# Patient Record
Sex: FEMALE | Race: WHITE
[De-identification: names, ages, dates, MRNs, and addresses within clinical notes are randomized per-mention and may not be internally consistent; named-entity substitution may affect disease eponyms.]

---

## 2017-01-10 ENCOUNTER — PRIOR ORIGINAL RECORDS (OUTPATIENT)
Dept: OTHER | Age: 82
End: 2017-01-10

## 2017-01-10 PROCEDURE — 36415 COLL VENOUS BLD VENIPUNCTURE: CPT | Performed by: INTERNAL MEDICINE

## 2017-01-10 PROCEDURE — 82550 ASSAY OF CK (CPK): CPT | Performed by: INTERNAL MEDICINE

## 2017-01-10 PROCEDURE — 80053 COMPREHEN METABOLIC PANEL: CPT | Performed by: INTERNAL MEDICINE

## 2017-01-10 PROCEDURE — 81003 URINALYSIS AUTO W/O SCOPE: CPT | Performed by: INTERNAL MEDICINE

## 2017-01-10 PROCEDURE — 80061 LIPID PANEL: CPT | Performed by: INTERNAL MEDICINE

## 2017-01-10 PROCEDURE — 83036 HEMOGLOBIN GLYCOSYLATED A1C: CPT | Performed by: INTERNAL MEDICINE

## 2017-01-10 PROCEDURE — 85025 COMPLETE CBC W/AUTO DIFF WBC: CPT | Performed by: INTERNAL MEDICINE

## 2017-02-06 ENCOUNTER — PRIOR ORIGINAL RECORDS (OUTPATIENT)
Dept: OTHER | Age: 82
End: 2017-02-06

## 2017-02-08 LAB
ALBUMIN: 3.7 G/DL
ALKALINE PHOSPHATATE(ALK PHOS): 68 IU/L
BILIRUBIN TOTAL: 0.5 MG/DL
BUN: 23 MG/DL
CALCIUM: 8.8 MG/DL
CHLORIDE: 104 MEQ/L
CHOLESTEROL, TOTAL: 158 MG/DL
CREATININE KINASE: 69 U/L
CREATININE, SERUM: 1.03 MG/DL
GLUCOSE: 172 MG/DL
HDL CHOLESTEROL: 80 MG/DL
HEMATOCRIT: 41.3 %
HEMOGLOBIN A1C: 7.3 %
HEMOGLOBIN: 14 G/DL
LDL CHOLESTEROL: 57 MG/DL
PLATELETS: 177 K/UL
POTASSIUM, SERUM: 4.1 MEQ/L
PROTEIN, TOTAL: 7.4 G/DL
RED BLOOD COUNT: 4.37 X 10-6/U
SGOT (AST): 18 IU/L
SGPT (ALT): 20 IU/L
SODIUM: 140 MEQ/L
TRIGLYCERIDES: 106 MG/DL
WHITE BLOOD COUNT: 6.9 X 10-3/U

## 2017-04-13 ENCOUNTER — APPOINTMENT (OUTPATIENT)
Dept: GENERAL RADIOLOGY | Age: 82
End: 2017-04-13
Attending: EMERGENCY MEDICINE
Payer: MEDICARE

## 2017-04-13 ENCOUNTER — HOSPITAL ENCOUNTER (EMERGENCY)
Age: 82
Discharge: HOME | End: 2017-04-13
Attending: EMERGENCY MEDICINE
Payer: MEDICARE

## 2017-04-13 VITALS
HEIGHT: 65 IN | HEART RATE: 88 BPM | WEIGHT: 220 LBS | SYSTOLIC BLOOD PRESSURE: 110 MMHG | RESPIRATION RATE: 20 BRPM | DIASTOLIC BLOOD PRESSURE: 63 MMHG | BODY MASS INDEX: 36.65 KG/M2 | OXYGEN SATURATION: 95 %

## 2017-04-13 DIAGNOSIS — I48.92 ATRIAL FIBRILLATION AND FLUTTER (HCC): Primary | ICD-10-CM

## 2017-04-13 DIAGNOSIS — I48.91 ATRIAL FIBRILLATION AND FLUTTER (HCC): Primary | ICD-10-CM

## 2017-04-13 PROCEDURE — 93005 ELECTROCARDIOGRAM TRACING: CPT

## 2017-04-13 PROCEDURE — 85610 PROTHROMBIN TIME: CPT | Performed by: EMERGENCY MEDICINE

## 2017-04-13 PROCEDURE — 99285 EMERGENCY DEPT VISIT HI MDM: CPT

## 2017-04-13 PROCEDURE — 36415 COLL VENOUS BLD VENIPUNCTURE: CPT

## 2017-04-13 PROCEDURE — 85025 COMPLETE CBC W/AUTO DIFF WBC: CPT | Performed by: EMERGENCY MEDICINE

## 2017-04-13 PROCEDURE — 85730 THROMBOPLASTIN TIME PARTIAL: CPT | Performed by: EMERGENCY MEDICINE

## 2017-04-13 PROCEDURE — 84484 ASSAY OF TROPONIN QUANT: CPT | Performed by: EMERGENCY MEDICINE

## 2017-04-13 PROCEDURE — 84443 ASSAY THYROID STIM HORMONE: CPT | Performed by: EMERGENCY MEDICINE

## 2017-04-13 PROCEDURE — 93010 ELECTROCARDIOGRAM REPORT: CPT

## 2017-04-13 PROCEDURE — 80053 COMPREHEN METABOLIC PANEL: CPT | Performed by: EMERGENCY MEDICINE

## 2017-04-13 PROCEDURE — 71010 XR CHEST AP PORTABLE  (CPT=71010): CPT

## 2017-04-13 RX ORDER — ASPIRIN 81 MG/1
81 TABLET, CHEWABLE ORAL ONCE
Status: COMPLETED | OUTPATIENT
Start: 2017-04-13 | End: 2017-04-13

## 2017-04-13 NOTE — ED PROVIDER NOTES
Patient Seen in: [facility] Emergency Department In [facility]    History   Patient presents with:  Arrythmia/Palpitations (cardiovascular)    Stated Complaint: PALPITATION    HPI    Patient is a 25-year-old female who has history of intermittent atrial fibr daily.   Irbesartan 300 MG Oral Tab,  Take 300 mg by mouth daily. aspirin 81 MG Oral Tab,  Take 81 mg by mouth every 7 days. LUMIGAN 0.01 % Ophthalmic Solution,     Benazepril HCl 20 MG Oral Tab,  Take 20 mg by mouth 2 (two) times daily.      Abel Garcia kg  BMI 36.61 kg/m2  SpO2 95%        Physical Exam  GENERAL: Patient resting comfortably on the cart in no acute distress. HEENT: Extraocular muscles intact, pupils equal round reactive to light and accommodation.   Mouth normal, neck supple, no meningismu REFLEX TO FREE T4   RAINBOW DRAW LAVENDER   RAINBOW DRAW LIGHT GREEN   RAINBOW DRAW BLUE   RAINBOW DRAW GOLD      EKG    Rate, intervals and axes as noted on EKG Report.   Rate: 93  Rhythm: Atrial Fibrillation  Reading: Atrial fibrillation, nonspecific ST c

## 2017-04-13 NOTE — ED INITIAL ASSESSMENT (HPI)
Pt states, \"I feel like I'm back in a-fib\". Pt states hx of afib. Denies chest pain, yet states SOB. Respirations regular, non-labored.

## 2017-04-14 ENCOUNTER — MYAURORA ACCOUNT LINK (OUTPATIENT)
Dept: OTHER | Age: 82
End: 2017-04-14

## 2017-04-14 ENCOUNTER — PRIOR ORIGINAL RECORDS (OUTPATIENT)
Dept: OTHER | Age: 82
End: 2017-04-14

## 2017-04-17 ENCOUNTER — PRIOR ORIGINAL RECORDS (OUTPATIENT)
Dept: OTHER | Age: 82
End: 2017-04-17

## 2017-04-19 LAB
ALBUMIN: 3.4 G/DL
ALKALINE PHOSPHATATE(ALK PHOS): 65 IU/L
BILIRUBIN TOTAL: 0.4 MG/DL
BUN: 23 MG/DL
CALCIUM: 9.3 MG/DL
CHLORIDE: 103 MEQ/L
CREATININE, SERUM: 1.34 MG/DL
GLUCOSE: 163 MG/DL
HEMATOCRIT: 42.7 %
HEMOGLOBIN: 14.2 G/DL
PLATELETS: 189 K/UL
POTASSIUM, SERUM: 4.1 MEQ/L
PROTEIN, TOTAL: 7.2 G/DL
RED BLOOD COUNT: 4.53 X 10-6/U
SGOT (AST): 12 IU/L
SGPT (ALT): 21 IU/L
SODIUM: 139 MEQ/L
THYROID STIMULATING HORMONE: 1.22 MLU/L
WHITE BLOOD COUNT: 7.3 X 10-3/U

## 2017-04-21 ENCOUNTER — PRIOR ORIGINAL RECORDS (OUTPATIENT)
Dept: OTHER | Age: 82
End: 2017-04-21

## 2017-04-24 ENCOUNTER — PRIOR ORIGINAL RECORDS (OUTPATIENT)
Dept: OTHER | Age: 82
End: 2017-04-24

## 2017-06-07 ENCOUNTER — PRIOR ORIGINAL RECORDS (OUTPATIENT)
Dept: OTHER | Age: 82
End: 2017-06-07

## 2017-06-07 PROCEDURE — 82043 UR ALBUMIN QUANTITATIVE: CPT | Performed by: INTERNAL MEDICINE

## 2017-06-07 PROCEDURE — 82570 ASSAY OF URINE CREATININE: CPT | Performed by: INTERNAL MEDICINE

## 2017-08-17 ENCOUNTER — PRIOR ORIGINAL RECORDS (OUTPATIENT)
Dept: OTHER | Age: 82
End: 2017-08-17

## 2017-08-17 ENCOUNTER — MYAURORA ACCOUNT LINK (OUTPATIENT)
Dept: OTHER | Age: 82
End: 2017-08-17

## 2017-08-25 LAB
ALBUMIN: 3.5 G/DL
ALKALINE PHOSPHATATE(ALK PHOS): 56 IU/L
BILIRUBIN TOTAL: 0.44 MG/DL
BUN: 27 MG/DL
CALCIUM: 9.7 MG/DL
CHLORIDE: 104 MEQ/L
CHOLESTEROL, TOTAL: 139 MG/DL
CREATININE, SERUM: 1.31 MG/DL
GLUCOSE: 185 MG/DL
HDL CHOLESTEROL: 65 MG/DL
HEMOGLOBIN A1C: 7.6 %
LDL CHOLESTEROL: 61 MG/DL
POTASSIUM, SERUM: 4.5 MEQ/L
PROTEIN, TOTAL: 7.2 G/DL
SGOT (AST): 17 IU/L
SGPT (ALT): 20 IU/L
SODIUM: 140 MEQ/L
TRIGLYCERIDES: 64 MG/DL
VITAMIN D 25-OH: 35.96 NG/ML

## 2017-10-17 PROBLEM — I50.22 CHRONIC SYSTOLIC CONGESTIVE HEART FAILURE (HCC): Status: ACTIVE | Noted: 2017-10-17

## 2017-10-17 PROBLEM — I25.10 CORONARY ARTERY DISEASE INVOLVING NATIVE CORONARY ARTERY OF NATIVE HEART WITHOUT ANGINA PECTORIS: Status: ACTIVE | Noted: 2017-10-17

## 2017-10-17 PROCEDURE — 82570 ASSAY OF URINE CREATININE: CPT | Performed by: INTERNAL MEDICINE

## 2017-10-17 PROCEDURE — 82043 UR ALBUMIN QUANTITATIVE: CPT | Performed by: INTERNAL MEDICINE

## 2017-11-09 PROBLEM — I25.5 ISCHEMIC CARDIOMYOPATHY: Status: ACTIVE | Noted: 2017-11-09

## 2017-11-09 PROBLEM — I25.5 ISCHEMIC CARDIOMYOPATHY: Status: RESOLVED | Noted: 2017-11-09 | Resolved: 2017-11-09

## 2017-11-09 PROBLEM — I50.22 CHRONIC SYSTOLIC CONGESTIVE HEART FAILURE (HCC): Status: RESOLVED | Noted: 2017-10-17 | Resolved: 2017-11-09

## 2017-11-09 PROBLEM — I42.2 HYPERTROPHIC NONOBSTRUCTIVE CARDIOMYOPATHY (HCC): Status: ACTIVE | Noted: 2017-11-09

## 2017-11-09 PROBLEM — I50.33 ACUTE ON CHRONIC DIASTOLIC HEART FAILURE (HCC): Status: ACTIVE | Noted: 2017-11-09

## 2018-01-17 PROBLEM — I50.32 CHRONIC DIASTOLIC HEART FAILURE (HCC): Status: ACTIVE | Noted: 2017-11-09

## 2018-07-24 PROBLEM — E66.09 CLASS 1 OBESITY DUE TO EXCESS CALORIES WITHOUT SERIOUS COMORBIDITY WITH BODY MASS INDEX (BMI) OF 33.0 TO 33.9 IN ADULT: Status: ACTIVE | Noted: 2018-07-24

## 2018-10-24 PROBLEM — H40.1134 PRIMARY OPEN ANGLE GLAUCOMA OF BOTH EYES, INDETERMINATE STAGE: Status: ACTIVE | Noted: 2017-10-26

## 2018-12-11 PROCEDURE — 82043 UR ALBUMIN QUANTITATIVE: CPT | Performed by: INTERNAL MEDICINE

## 2018-12-11 PROCEDURE — 82570 ASSAY OF URINE CREATININE: CPT | Performed by: INTERNAL MEDICINE

## 2019-01-08 PROBLEM — K62.5 RECTAL BLEEDING: Status: ACTIVE | Noted: 2019-01-08

## 2019-02-28 VITALS
WEIGHT: 213 LBS | DIASTOLIC BLOOD PRESSURE: 84 MMHG | HEIGHT: 65 IN | SYSTOLIC BLOOD PRESSURE: 128 MMHG | BODY MASS INDEX: 35.49 KG/M2 | HEART RATE: 70 BPM

## 2019-03-01 VITALS
HEIGHT: 66 IN | BODY MASS INDEX: 35.2 KG/M2 | SYSTOLIC BLOOD PRESSURE: 124 MMHG | WEIGHT: 219 LBS | HEART RATE: 60 BPM | DIASTOLIC BLOOD PRESSURE: 60 MMHG

## 2019-03-01 VITALS
HEIGHT: 66 IN | WEIGHT: 218 LBS | DIASTOLIC BLOOD PRESSURE: 80 MMHG | BODY MASS INDEX: 35.03 KG/M2 | HEART RATE: 66 BPM | SYSTOLIC BLOOD PRESSURE: 144 MMHG

## 2019-06-21 PROBLEM — K62.5 RECTAL BLEEDING: Status: RESOLVED | Noted: 2019-01-08 | Resolved: 2019-06-21

## 2019-10-21 PROBLEM — J22 LOWER RESP. TRACT INFECTION: Status: ACTIVE | Noted: 2019-10-21

## 2019-10-21 PROBLEM — J01.00 ACUTE NON-RECURRENT MAXILLARY SINUSITIS: Status: ACTIVE | Noted: 2019-10-21

## 2019-10-21 PROBLEM — R05.9 COUGH: Status: ACTIVE | Noted: 2019-10-21

## 2019-10-30 PROBLEM — J22 LOWER RESP. TRACT INFECTION: Status: RESOLVED | Noted: 2019-10-21 | Resolved: 2019-10-30

## 2019-10-30 PROBLEM — J01.00 ACUTE NON-RECURRENT MAXILLARY SINUSITIS: Status: RESOLVED | Noted: 2019-10-21 | Resolved: 2019-10-30

## 2019-10-30 PROBLEM — R05.9 COUGH: Status: RESOLVED | Noted: 2019-10-21 | Resolved: 2019-10-30

## 2019-11-21 PROBLEM — I87.2 VENOUS INSUFFICIENCY (CHRONIC) (PERIPHERAL): Status: ACTIVE | Noted: 2019-11-21

## 2020-01-06 PROBLEM — N18.2 TYPE 2 DIABETES MELLITUS WITH STAGE 2 CHRONIC KIDNEY DISEASE, WITHOUT LONG-TERM CURRENT USE OF INSULIN (HCC): Status: ACTIVE | Noted: 2020-01-06

## 2020-01-06 PROBLEM — J18.9 COMMUNITY ACQUIRED PNEUMONIA OF RIGHT LOWER LOBE OF LUNG: Status: ACTIVE | Noted: 2020-01-06

## 2020-01-06 PROBLEM — E11.22 TYPE 2 DIABETES MELLITUS WITH STAGE 2 CHRONIC KIDNEY DISEASE, WITHOUT LONG-TERM CURRENT USE OF INSULIN (HCC): Status: ACTIVE | Noted: 2020-01-06

## 2020-05-04 PROBLEM — J18.9 COMMUNITY ACQUIRED PNEUMONIA OF RIGHT LOWER LOBE OF LUNG: Status: RESOLVED | Noted: 2020-01-06 | Resolved: 2020-05-04

## 2020-09-25 PROBLEM — E87.1 HYPONATREMIA: Status: ACTIVE | Noted: 2020-09-25

## 2020-09-25 PROBLEM — E66.09 CLASS 1 OBESITY DUE TO EXCESS CALORIES WITHOUT SERIOUS COMORBIDITY WITH BODY MASS INDEX (BMI) OF 30.0 TO 30.9 IN ADULT: Status: ACTIVE | Noted: 2018-07-24

## 2020-11-02 PROBLEM — F32.9 REACTIVE DEPRESSION: Status: ACTIVE | Noted: 2020-11-02

## 2020-11-02 PROBLEM — E87.1 HYPONATREMIA: Status: RESOLVED | Noted: 2020-09-25 | Resolved: 2020-11-02

## 2021-04-13 PROBLEM — F32.9 REACTIVE DEPRESSION: Status: RESOLVED | Noted: 2020-11-02 | Resolved: 2021-04-13

## 2021-04-13 PROBLEM — H35.3231 EXUDATIVE AGE-RELATED MACULAR DEGENERATION, BILATERAL, WITH ACTIVE CHOROIDAL NEOVASCULARIZATION (HCC): Status: ACTIVE | Noted: 2021-04-13

## 2021-04-15 PROBLEM — R06.00 DOE (DYSPNEA ON EXERTION): Status: ACTIVE | Noted: 2021-04-15

## 2021-04-15 PROBLEM — R00.2 PALPITATION: Status: ACTIVE | Noted: 2021-04-15

## 2021-10-04 PROBLEM — G47.9 SLEEP DISTURBANCE: Status: ACTIVE | Noted: 2021-10-04

## 2021-10-04 PROBLEM — R26.9 GAIT DISTURBANCE: Status: ACTIVE | Noted: 2021-10-04

## 2021-11-05 PROBLEM — R00.2 PALPITATION: Status: RESOLVED | Noted: 2021-04-15 | Resolved: 2021-11-05

## (undated) NOTE — ED AVS SNAPSHOT
[facility] Emergency Department in [address]    Phone:  [phone]    Fax:  [phone] [facility]   MRN: [id number]    Department:  [facility] Emergency Department in [place]   Date of Visit: Pediatric [phone] Emergency Department   [phone]       To Check ER Wait Times:  TEXT 'ERwait' to 37865      Click [email or web address]      Or call [phone]    If you have any problems with your follow-up, please call our case [facility] before you leave. After you leave, you should follow the attached instructions. I have read and understand the instructions given to me by my caregivers. [facility]        Pharmacy Address Phone Number   [facility] [address] Impression:    CONCLUSION:       Mild pulmonary vascular congestion. Nonspecific patchy left upper lobe and right infrahilar opacities may represent developing consolidation.            Dictated by: Zuhair Rico MD on 4/13/2017 at 13:14       Appr

## (undated) NOTE — ED AVS SNAPSHOT
[facility] Emergency Department in [address]    Phone:  [phone]    Fax:  [phone]   MRN: [id number]    Department:  [facility] Emergency Department in [place]   Date of Visit: IF THERE IS ANY CHANGE OR WORSENING OF YOUR CONDITION, CALL YOUR PRIMARY CARE PHYSICIAN AT ONCE OR RETURN IMMEDIATELY TO THE EMERGENCY DEPARTMENT.     If you have been prescribed any medication(s), please fill your prescription right away and begin taking t